# Patient Record
Sex: MALE | Race: BLACK OR AFRICAN AMERICAN | NOT HISPANIC OR LATINO | URBAN - METROPOLITAN AREA
[De-identification: names, ages, dates, MRNs, and addresses within clinical notes are randomized per-mention and may not be internally consistent; named-entity substitution may affect disease eponyms.]

---

## 2025-01-21 ENCOUNTER — OFFICE VISIT (OUTPATIENT)
Dept: OTOLARYNGOLOGY | Facility: CLINIC | Age: 30
End: 2025-01-21
Payer: COMMERCIAL

## 2025-01-21 VITALS — TEMPERATURE: 97.9 F | HEIGHT: 73 IN | BODY MASS INDEX: 29.29 KG/M2 | WEIGHT: 221 LBS

## 2025-01-21 DIAGNOSIS — R60.9 SWELLING: Primary | ICD-10-CM

## 2025-01-21 DIAGNOSIS — R04.0 EPISTAXIS: ICD-10-CM

## 2025-01-21 DIAGNOSIS — R09.81 NASAL CONGESTION: ICD-10-CM

## 2025-01-21 DIAGNOSIS — J33.9 NASAL POLYP: ICD-10-CM

## 2025-01-21 DIAGNOSIS — J30.9 ALLERGIC RHINITIS, UNSPECIFIED SEASONALITY, UNSPECIFIED TRIGGER: ICD-10-CM

## 2025-01-21 DIAGNOSIS — J32.9 SINUSITIS, UNSPECIFIED CHRONICITY, UNSPECIFIED LOCATION: ICD-10-CM

## 2025-01-21 PROCEDURE — 31231 NASAL ENDOSCOPY DX: CPT

## 2025-01-21 PROCEDURE — 30901 CONTROL OF NOSEBLEED: CPT

## 2025-01-21 PROCEDURE — 3008F BODY MASS INDEX DOCD: CPT

## 2025-01-21 PROCEDURE — 99203 OFFICE O/P NEW LOW 30 MIN: CPT

## 2025-01-21 RX ORDER — AZITHROMYCIN 250 MG/1
TABLET, FILM COATED ORAL
Qty: 6 TABLET | Refills: 0 | Status: SHIPPED | OUTPATIENT
Start: 2025-01-21 | End: 2025-01-26

## 2025-01-21 RX ORDER — METHYLPREDNISOLONE 4 MG/1
TABLET ORAL
Qty: 21 TABLET | Refills: 0 | Status: SHIPPED | OUTPATIENT
Start: 2025-01-21

## 2025-01-21 NOTE — PROGRESS NOTES
"Chief Complaint   Patient presents with    New Patient Visit     CHRONIC SINUSITIS X COUPLE MONTHS     HPI:  Antione Russell is a 29 y.o. male Antione complains for of seasonal allergies which have worsened over the past year.  Today his symptoms include on can controlled nasal congestion using Afrin, frequent nosebleeds and sore throat in the a.m.  He has been using Mucinex for his nasal congestion also.  He has had 3 episodes of epistaxis in the past 5 days all on the right.  He does use Afrin for those also.  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  PMH:  Past Medical History:   Diagnosis Date    Personal history of other infectious and parasitic diseases     History of gonorrhea    Syphilis, unspecified 07/22/2013    Syphilis     Past Surgical History:   Procedure Laterality Date    CIRCUMCISION, PRIMARY  12/07/2015    Elective Circumcision         Medications:     Current Outpatient Medications:     azithromycin (Zithromax Z-Nabeel) 250 mg tablet, Take 2 tabs (500 mg) by mouth today, then 1 tab (250 mg) daily for 4 days., Disp: 6 tablet, Rfl: 0    fluticasone propionate 93 mcg/actuation aerosol breath activated, 2 puffs each nostril twice daily, Disp: 1 mL, Rfl: 11    methylPREDNISolone (Medrol Dospak) 4 mg tablets, Take as directed on package., Disp: 21 tablet, Rfl: 0     Allergies:  No Known Allergies     ROS:  Review of systems normal unless stated otherwise in the HPI and/or PMH.    Physical Exam:  Temperature 36.6 °C (97.9 °F), height 1.854 m (6' 1\"), weight 100 kg (221 lb). Body mass index is 29.16 kg/m².     GENERAL APPEARANCE: Well developed and well nourished.  Alert and oriented in no acute distress.  Normal vocal quality.      HEAD/FACE: No erythema or edema or facial tenderness.  Normal facial nerve function bilaterally.    EAR:       EXTERNAL: Normal pinnas and external auditory canals without lesion or obstructing wax.       MIDDLE EAR: Tympanic membranes intact and mobile with normal " landmarks.  Middle ear space appears well aerated.       TUBE STATUS: N/A       MASTOID CAVITY: N/A       HEARING: Gross hearing assessment is within normal limits.      NOSE:       VISUALIZED USING: Anterior rhinoscopy with headlight and nasal speculum and flex nasal endoscopy       DORSUM: Midline, nontraumatic appearance.       MUCOSA: Normal-appearing.       SECRETIONS: Thick white to clear       SEPTUM: Midline, deviated to right and nonobstructing.       INFERIOR TURBINATES: pale and enlarged       MIDDLE TURBINATES/MEATUS: pale and enlarged with polypoid appearance on the right and polyps noted on the left       BLEEDING: Right Kiesselbach's plexus         ORAL CAVITY/PHARYNX:       TEETH: Adequate dentition.       TONGUE: No mass or lesion.  Normal mobility.       FLOOR OF MOUTH: No mass or lesion.       PALATE: Normal hard palate, soft palate, and uvula.       OROPHARYNX: Normal without mass or lesion.       BUCCAL MUCOSA/GBS: Normal without mass or lesion.       LIPS: Normal.    LARYNX/HYPOPHARYNX/NASOPHARYNX: Flexible laryngoscopy was performed after consent. The flexible laryngoscopy was was placed through the nasal cavity revealing normal oropharynx, normal hypopharynx and normal larynx. Normal bilateral vocal cord mobility without any mucosal masses or lesions.    NECK: No palpable masses or abnormal adenopathy.  Trachea is midline.    THYROID: No thyromegaly or palpable nodule.    SALIVARY GLANDS: Normal bilateral parotid and submandibular glands by inspection and palpation.    TMJ's: Normal.    NEURO: Cranial nerve exam grossly normal bilaterally.     Procedure: Epistaxis site identified with anterior rhinoscopy and headlight at right Kiesselbach's plexus.  Obtained verbal consent and applied topical anesthetic. Cauterized vessel without difficulty patient tolerated well.    Assessment/Plan   Antione was seen today for new patient visit.  Diagnoses and all orders for this visit:  Swelling (Primary)  -      methylPREDNISolone (Medrol Dospak) 4 mg tablets; Take as directed on package.  Sinusitis, unspecified chronicity, unspecified location  -     azithromycin (Zithromax Z-Nabeel) 250 mg tablet; Take 2 tabs (500 mg) by mouth today, then 1 tab (250 mg) daily for 4 days.  Nasal polyp  -     fluticasone propionate 93 mcg/actuation aerosol breath activated; 2 puffs each nostril twice daily  Epistaxis  Allergic rhinitis, unspecified seasonality, unspecified trigger  Nasal congestion     Instructions given for epistaxis control.  I have asked him to use triple antibiotic twice daily both nares x 7 days.  I have asked him to limit his lifting bending blowing and snorting.  For 7 days also.  I gave him samples of saline gel to use for nasal dryness.  I have asked him to stop using Afrin except if needed for a nosebleed.  I will give him a Medrol Dosepak, Zithromax and Xhance to decrease his nasal nasal congestion and to aid in polyp reduction.  We reviewed this information twice he was agreeable and will will return in 4 weeks for a recheck.    Follow up in about 4 weeks (around 2/18/2025).     FABIO Oreilly-CNP

## 2025-02-07 ENCOUNTER — TELEPHONE (OUTPATIENT)
Dept: OTOLARYNGOLOGY | Facility: CLINIC | Age: 30
End: 2025-02-07
Payer: COMMERCIAL

## 2025-02-07 NOTE — TELEPHONE ENCOUNTER
"Lov 1/21/2025 FOR SINUSITIS AND WAS RX'D ZPAK AND MDP.  He completed as rx'd, except one day of prednisone he had to catch up the following day - I believe he said it was day 4-5. Since completing he says he is having \"twitching\" of his left kidney and left face. He said it is not painful. Please advise.   "

## 2025-02-18 ENCOUNTER — APPOINTMENT (OUTPATIENT)
Dept: OTOLARYNGOLOGY | Facility: CLINIC | Age: 30
End: 2025-02-18
Payer: COMMERCIAL

## 2025-02-18 VITALS — WEIGHT: 240 LBS | BODY MASS INDEX: 31.81 KG/M2 | HEIGHT: 73 IN

## 2025-02-18 DIAGNOSIS — J33.9 NASAL POLYP: ICD-10-CM

## 2025-02-18 DIAGNOSIS — R09.81 NASAL CONGESTION: Primary | ICD-10-CM

## 2025-02-18 DIAGNOSIS — J30.89 SEASONAL AND PERENNIAL ALLERGIC RHINITIS: ICD-10-CM

## 2025-02-18 DIAGNOSIS — J30.2 SEASONAL AND PERENNIAL ALLERGIC RHINITIS: ICD-10-CM

## 2025-02-18 DIAGNOSIS — J32.9 SINUSITIS, UNSPECIFIED CHRONICITY, UNSPECIFIED LOCATION: ICD-10-CM

## 2025-02-18 DIAGNOSIS — R04.0 EPISTAXIS: ICD-10-CM

## 2025-02-18 PROCEDURE — 99212 OFFICE O/P EST SF 10 MIN: CPT

## 2025-02-18 PROCEDURE — 3008F BODY MASS INDEX DOCD: CPT

## 2025-02-18 NOTE — PROGRESS NOTES
"Chief Complaint   Patient presents with    Follow-up     EP- 1 MO FOLLOW UP, NOSEBLEEDS/SINUS ISSUES     HPI:  Antione Russell is a 29 y.o. male here for follow-up after treatment for nasal congestion with nasal polyposis, and epistaxis.  He reports following antibiotic and steroids he initially felt much better.  No further epistaxis.  But dog destroyed Xhance and he never started using it report reports nasal congestion is only slightly better.     Antione complains for of seasonal allergies which have worsened over the past year.  Today his symptoms include on can controlled nasal congestion using Afrin, frequent nosebleeds and sore throat in the a.m.  He has been using Mucinex for his nasal congestion also.  He has had 3 episodes of epistaxis in the past 5 days all on the right.  He does use Afrin for those also.  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    PMH:  Past Medical History:   Diagnosis Date    Personal history of other infectious and parasitic diseases     History of gonorrhea    Syphilis, unspecified 07/22/2013    Syphilis     Past Surgical History:   Procedure Laterality Date    CIRCUMCISION, PRIMARY  12/07/2015    Elective Circumcision         Medications:     Current Outpatient Medications:     fluticasone propionate 93 mcg/actuation aerosol breath activated, 2 puffs each nostril twice daily, Disp: 1 mL, Rfl: 11    methylPREDNISolone (Medrol Dospak) 4 mg tablets, Take as directed on package. (Patient not taking: Reported on 2/18/2025), Disp: 21 tablet, Rfl: 0     Allergies:  No Known Allergies     ROS:  Review of systems normal unless stated otherwise in the HPI and/or PMH.    Physical Exam:  Height 1.854 m (6' 1\"), weight 109 kg (240 lb). Body mass index is 31.66 kg/m².     GENERAL APPEARANCE: Well developed and well nourished.  Alert and oriented in no acute distress.  Normal vocal quality.      HEAD/FACE: No erythema or edema or facial tenderness.  Normal facial nerve function " bilaterally.    EAR:       EXTERNAL: Normal pinnas and external auditory canals without lesion or obstructing wax.       MIDDLE EAR: Tympanic membranes intact and mobile with normal landmarks.  Middle ear space appears well aerated.       TUBE STATUS: N/A       MASTOID CAVITY: N/A       HEARING: Gross hearing assessment is within normal limits.      NOSE:       VISUALIZED USING: Anterior rhinoscopy with headlight and nasal speculum and flex nasal endoscopy       DORSUM: Midline, nontraumatic appearance.       MUCOSA: Normal-appearing.       SECRETIONS: Clear today       SEPTUM: Midline, deviated to right and nonobstructing.       INFERIOR TURBINATES: pale and enlarged       MIDDLE TURBINATES/MEATUS: Posterior to inferior turbinate polyp noted on the left       BLEEDING: Right Kiesselbach's plexus         ORAL CAVITY/PHARYNX:       TEETH: Adequate dentition.       TONGUE: No mass or lesion.  Normal mobility.       FLOOR OF MOUTH: No mass or lesion.       PALATE: Normal hard palate, soft palate, and uvula.       OROPHARYNX: Normal without mass or lesion.       BUCCAL MUCOSA/GBS: Normal without mass or lesion.       LIPS: Normal.    LARYNX/HYPOPHARYNX/NASOPHARYNX: N/A    NECK: No palpable masses or abnormal adenopathy.  Trachea is midline.    THYROID: No thyromegaly or palpable nodule.    SALIVARY GLANDS: Normal bilateral parotid and submandibular glands by inspection and palpation.    TMJ's: Normal.    NEURO: Cranial nerve exam grossly normal bilaterally.     Procedure: Epistaxis site identified with anterior rhinoscopy and headlight at right Kiesselbach's plexus.  Obtained verbal consent and applied topical anesthetic. Cauterized vessel without difficulty patient tolerated well.    Assessment/Plan   Antione was seen today for follow-up.  Diagnoses and all orders for this visit:  Nasal congestion (Primary)  Seasonal and perennial allergic rhinitis  -     Allergy tests with airborne; Future  Nasal polyp  Sinusitis,  unspecified chronicity, unspecified location  Epistaxis    We discussed the use of Xhance and gave him sample again today with video training and pharmacy information.  We further discussed possible allergies and treatments.  He would like to proceed with allergy testing.  He will reschedule after allergy testing and Xhance trial.  If symptoms have not improved we will order CT of sinuses.  No follow-ups on file.     Lisandra Carlos, APRN-CNP

## 2025-03-06 ENCOUNTER — APPOINTMENT (OUTPATIENT)
Dept: OTOLARYNGOLOGY | Facility: CLINIC | Age: 30
End: 2025-03-06
Payer: COMMERCIAL

## 2025-03-13 ENCOUNTER — APPOINTMENT (OUTPATIENT)
Dept: OTOLARYNGOLOGY | Facility: CLINIC | Age: 30
End: 2025-03-13
Payer: COMMERCIAL

## 2025-05-01 ENCOUNTER — OFFICE VISIT (OUTPATIENT)
Dept: DERMATOLOGY | Facility: CLINIC | Age: 30
End: 2025-05-01
Payer: COMMERCIAL

## 2025-05-01 DIAGNOSIS — D48.5 NEOPLASM OF UNCERTAIN BEHAVIOR OF SKIN: Primary | ICD-10-CM

## 2025-05-01 DIAGNOSIS — A63.0 GENITAL WARTS: ICD-10-CM

## 2025-05-01 PROCEDURE — 54100 BIOPSY OF PENIS: CPT | Performed by: STUDENT IN AN ORGANIZED HEALTH CARE EDUCATION/TRAINING PROGRAM

## 2025-05-01 PROCEDURE — 99204 OFFICE O/P NEW MOD 45 MIN: CPT | Performed by: STUDENT IN AN ORGANIZED HEALTH CARE EDUCATION/TRAINING PROGRAM

## 2025-05-01 NOTE — Clinical Note
Concerning lesion found on exam. DDX for lesion includes: sk vs genital wart vs other. The need for biopsy to aid in diagnosis was discussed and recommended. Risks and benefits of biopsy were reviewed. See procedure note.

## 2025-05-01 NOTE — PROGRESS NOTES
Subjective     Antione Russell is a 29 y.o. male who presents for the following: Suspicious Skin Lesion (Lesion of concern to penis. Noticed lesion about 1 month ago, seen at ED, negative STI's, recommended if did not resolve to see derm.  ).     Review of Systems:  No other skin or systemic complaints other than what is documented elsewhere in the note.    The following portions of the chart were reviewed this encounter and updated as appropriate:          Skin Cancer History  Biopsy Log Book  No skin cancers from Specimen Tracking.    Additional History      Specialty Problems    None       Objective   Well appearing patient in no apparent distress; mood and affect are within normal limits.    A focused skin examination was performed. All findings within normal limits unless otherwise noted below.    Right Penile Shaft  5 mm hyperpigmented smooth papule  Ventral Penile Shaft  Two smooth hyperpigmented papules           Assessment/Plan   NEOPLASM OF UNCERTAIN BEHAVIOR OF SKIN  Right Penile Shaft  Lesion biopsy  Type of biopsy: tangential    Informed consent: discussed and consent obtained    Timeout: patient name, date of birth, surgical site, and procedure verified    Procedure prep:  Patient was prepped and draped  Anesthesia: the lesion was anesthetized in a standard fashion    Anesthetic:  1% lidocaine w/ epinephrine 1-100,000 local infiltration  Instrument used: DermaBlade    Hemostasis achieved with: aluminum chloride    Outcome: patient tolerated procedure well    Post-procedure details: sterile dressing applied and wound care instructions given    Dressing type: petrolatum and bandage      Staff Communication: Dermatology Local Anesthesia: 1 % Lidocaine / Epinephrine - Amount: 3 ml  Specimen 1 - Dermatopathology- DERM LAB  Differential Diagnosis: genital wart vs sk vs other  Check Margins Yes/No?:    Comments:    Dermpath Lab: Routine Histopathology (formalin-fixed tissue)  Concerning lesion found on exam.  DDX for lesion includes: sk vs genital wart vs other. The need for biopsy to aid in diagnosis was discussed and recommended. Risks and benefits of biopsy were reviewed. See procedure note.  GENITAL WARTS  Ventral Penile Shaft  Discussed likely diagnosis and sexually transmitted nature of condition  Reviewed cause by HPV   Can be spread to others   imiquimod (Aldara) 5 % cream - Ventral Penile Shaft  Apply 1 packet topically 3 times a week. Apply to warts nightly MWF and wash off in am. Wash hands thoroughly after each application.     I saw and evaluated the patient. I personally obtained the key and critical portions of the history and physical exam or was physically present for key and critical portions performed by the resident. I reviewed the resident's documentation and discussed the patient with the resident. I agree with the resident's medical decision making as documented in the note.    Roro Payne MD

## 2025-05-01 NOTE — Clinical Note
Discussed likely diagnosis and sexually transmitted nature of condition  Reviewed cause by HPV   Can be spread to others

## 2025-05-02 ENCOUNTER — OFFICE VISIT (OUTPATIENT)
Dept: URGENT CARE | Age: 30
End: 2025-05-02
Payer: COMMERCIAL

## 2025-05-02 VITALS — TEMPERATURE: 98.1 F | HEART RATE: 75 BPM | OXYGEN SATURATION: 97 %

## 2025-05-02 DIAGNOSIS — M72.2 PLANTAR FASCIITIS OF LEFT FOOT: Primary | ICD-10-CM

## 2025-05-02 RX ORDER — IMIQUIMOD 12.5 MG/.25G
1 CREAM TOPICAL 3 TIMES WEEKLY
Qty: 24 PACKET | Refills: 1 | Status: SHIPPED | OUTPATIENT
Start: 2025-05-02 | End: 2026-05-02

## 2025-05-02 RX ORDER — IBUPROFEN 600 MG/1
600 TABLET ORAL EVERY 8 HOURS PRN
Qty: 60 TABLET | Refills: 0 | Status: SHIPPED | OUTPATIENT
Start: 2025-05-02 | End: 2025-05-22

## 2025-05-02 NOTE — PROGRESS NOTES
History Of Present Illness:   Antione Russell             Patient presents to the [facility]:     Plantar aspect of left foot.  He has noticed lumps on the bottom of his left foot for the past year and a half.  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  Yesterday he noticed sharp pain in the plantar aspect of his left midfoot.  Since then he has had worsening pain than usual.  Usually his pain comes and goes.                       Social History:    - work: , stands in a warehouse often.  Will need a work note stating that he can return to work tomorrow.                                 Review of Systems (BOLD if positive, delete if not asked):     Constitutional:   - fever   - chills   - night sweats  - unexpected weight change         Cardiovascular:   - chest pain  - chest heaviness  - palpitations  - swelling in ankles       Respiratory:   - shortness of breath  - difficulty breathing  - frequent cough  - wheezing    Musculoskeletal:   - bone pain  - muscle pain  - joint pain   -low back pain       Neurological:   - headache  - loss of consciousness  - tremors  - dizzy spells  - numbness   - tingling       Gastrointestinal:   - abdominal pain           Skin:   - Rash  - lumps        Hematologic/Lymphatic:   - swollen glands  - blood clotting problems  - easy bruising       Last Recorded Vitals:  Vitals:    05/02/25 1559   Pulse: 75   Temp: 36.7 °C (98.1 °F)   SpO2: 97%        Past Medical History:  He has a past medical history of Personal history of other infectious and parasitic diseases and Syphilis, unspecified (07/22/2013).     Past Surgical History:  He has a past surgical history that includes Circumcision, primary (12/07/2015).     Social History:  He reports that he has been smoking cigarettes. He has never been exposed to tobacco smoke. He has  never used smokeless tobacco. No history on file for alcohol use and drug use.     Family History:  Family History[1]  Allergies:  Patient has no known allergies.     Outpatient Medications:  Current Outpatient Medications   Medication Instructions    fluticasone propionate 93 mcg/actuation aerosol breath activated 2 puffs each nostril twice daily    imiquimod (Aldara) 5 % cream 1 packet, Topical, 3 times weekly, Apply to warts nightly MWF and wash off in am. Wash hands thoroughly after each application.    methylPREDNISolone (Medrol Dospak) 4 mg tablets Take as directed on package.                Physical Exam:  GENERAL: Well developed, well nourished, alert and cooperative, and appears to be in no acute distress.     PSYCH: mood pleasant and appropriate     HEAD: normocephalic            LUNGS: Good respiratory effort.            GAIT: Normal           EXTREMITIES: All 4 extremities are warm and well perfused.   Peripheral pulses intact.            MUSCULOSKELETAL:  Left foot: Pes planus noted on exam, lacking flexibility.  Plantar fascia was minimally tender to palpation.  There were a few discrete nodules midfoot overlying his arch that were tender to palpation.  Patient's assistance there was no new skin changes or discoloration.             TIBIA & FIBIA  MRN: [id number]  Patient Name: NICHOLAS WILLOUGHBY     STUDY:  KNEE; COMPLT, 4 OR MORE VIEWS; TIBIA ;  Left6/4/2022 9:04 pm     INDICATION:  Fall ; tibial tuberosity trauma .     COMPARISON:  None.     ACCESSION NUMBER(S):  [id number]; [id number]     ORDERING CLINICIAN:  ZBIGNIEW CANO     FINDINGS:  Four views of the left knee and AP and lateral views of the left  tibia/fibula were obtained.     Left knee: There is no acute fracture or dislocation.  There is a  small suprapatellar joint effusion. The soft tissues are unremarkable.     Left tibia/fibula: There is no acute fracture or dislocation. The  soft tissues are unremarkable.     IMPRESSION:  1.  No radiographic  evidence for acute fracture at the left knee,  tibia or fibula. Small joint effusion at the left knee..        XR KNEE COMPLETE 4 OR MORE VIEWS  MRN: [id number]  Patient Name: NICHOLAS WILLOUGHBY     STUDY:  KNEE; COMPLT, 4 OR MORE VIEWS; TIBIA ;  Left6/4/2022 9:04 pm     INDICATION:  Fall ; tibial tuberosity trauma .     COMPARISON:  None.     ACCESSION NUMBER(S):  [id number]; [id number]     ORDERING CLINICIAN:  ZBIGNIEW CANO     FINDINGS:  Four views of the left knee and AP and lateral views of the left  tibia/fibula were obtained.     Left knee: There is no acute fracture or dislocation.  There is a  small suprapatellar joint effusion. The soft tissues are unremarkable.     Left tibia/fibula: There is no acute fracture or dislocation. The  soft tissues are unremarkable.     IMPRESSION:  1.  No radiographic evidence for acute fracture at the left knee,  tibia or fibula. Small joint effusion at the left knee..                     Assessment/Plan   Problem List Items Addressed This Visit    None  Visit Diagnoses         Codes      Plantar fasciitis of left foot    -  Primary M72.2                   Nice to meet you in the urgent care today.      Aspects of pes planus and nodules on the plantar aspect of your plantar fascia.  We discussed calf stretching daily and dorsiflexing your left first toe which we show today in the office.    Also recommend over-the-counter arch supports.    You could consider following up with podiatry or primary care or sports medicine for further evaluation of your feet.      New medications today:  - Ibuprofen 600 mg prescription            Referrals and advanced imaging scheduling line: [phone].  Another scheduling number is: [phone].  Another potential phone number is: [phone] ([phone]).  The number for pediatric referrals is: [phone].            Work note printed.           Patient disposition: Home      Recommend you get evaluated in the Emergency Department or call  9-1-1, if you experience chest pain, severe difficulty with breathing, loss of consciousness, major trauma, uncontrolled fever or blood pressure, sustained heart rate over 100, loss of vision, more than a teaspoon of bleeding from your GI tract, or signs of stroke.         Abdirizak Gerardo DO         [1] No family history on file.

## 2025-05-02 NOTE — PATIENT INSTRUCTIONS
Aspects of pes planus and nodules on the plantar aspect of your plantar fascia.  We discussed calf stretching daily and dorsiflexing your left first toe which we show today in the office.    Also recommend over-the-counter arch supports.    You could consider following up with podiatry or primary care or sports medicine for further evaluation of your feet.      New medications today:  - Ibuprofen 600 mg prescription

## 2025-05-02 NOTE — LETTER
May 2, 2025     Patient: Antione Russell   YOB: 1995   Date of Visit: 5/2/2025       To Whom It May Concern:    Antione Russell was seen in my clinic on 5/2/2025 at 4:00 pm. Please excuse Antione for his absence from work on this day to make the appointment.      May fully RTW tomorrow.     If you have any questions or concerns, please don't hesitate to call.         Sincerely,         CCWS [facility]        CC: No Recipients

## 2025-05-05 LAB
LABORATORY COMMENT REPORT: NORMAL
PATH REPORT.FINAL DX SPEC: NORMAL
PATH REPORT.GROSS SPEC: NORMAL
PATH REPORT.MICROSCOPIC SPEC OTHER STN: NORMAL
PATH REPORT.RELEVANT HX SPEC: NORMAL
PATH REPORT.TOTAL CANCER: NORMAL

## 2025-06-27 ENCOUNTER — HOSPITAL ENCOUNTER (EMERGENCY)
Facility: HOSPITAL | Age: 30
Discharge: HOME | End: 2025-06-27
Payer: COMMERCIAL

## 2025-06-27 VITALS
DIASTOLIC BLOOD PRESSURE: 88 MMHG | SYSTOLIC BLOOD PRESSURE: 142 MMHG | BODY MASS INDEX: 31.66 KG/M2 | OXYGEN SATURATION: 98 % | HEART RATE: 85 BPM | TEMPERATURE: 97.9 F | RESPIRATION RATE: 16 BRPM | WEIGHT: 240 LBS

## 2025-06-27 DIAGNOSIS — A60.02 HERPES SIMPLEX OF MALE GENITALIA: ICD-10-CM

## 2025-06-27 DIAGNOSIS — Z20.2 STD EXPOSURE: Primary | ICD-10-CM

## 2025-06-27 LAB
APPEARANCE UR: CLEAR
BILIRUB UR STRIP.AUTO-MCNC: NEGATIVE MG/DL
COLOR UR: YELLOW
GLUCOSE UR STRIP.AUTO-MCNC: NORMAL MG/DL
HCV AB SER QL: NONREACTIVE
HERPES SIMPLEX VIRUS 1 IGG: 0.3 INDEX
HERPES SIMPLEX VIRUS 2 IGG: <0.2 INDEX
HIV 1+2 AB+HIV1 P24 AG SERPL QL IA: NONREACTIVE
KETONES UR STRIP.AUTO-MCNC: NEGATIVE MG/DL
LEUKOCYTE ESTERASE UR QL STRIP.AUTO: NEGATIVE
NITRITE UR QL STRIP.AUTO: NEGATIVE
PH UR STRIP.AUTO: 5.5 [PH]
PROT UR STRIP.AUTO-MCNC: NEGATIVE MG/DL
RBC # UR STRIP.AUTO: NEGATIVE MG/DL
SP GR UR STRIP.AUTO: 1.03
TREPONEMA PALLIDUM IGG+IGM AB [PRESENCE] IN SERUM OR PLASMA BY IMMUNOASSAY: NONREACTIVE
UROBILINOGEN UR STRIP.AUTO-MCNC: NORMAL MG/DL

## 2025-06-27 PROCEDURE — 36415 COLL VENOUS BLD VENIPUNCTURE: CPT

## 2025-06-27 PROCEDURE — 96372 THER/PROPH/DIAG INJ SC/IM: CPT

## 2025-06-27 PROCEDURE — 87389 HIV-1 AG W/HIV-1&-2 AB AG IA: CPT

## 2025-06-27 PROCEDURE — 81003 URINALYSIS AUTO W/O SCOPE: CPT

## 2025-06-27 PROCEDURE — 86696 HERPES SIMPLEX TYPE 2 TEST: CPT

## 2025-06-27 PROCEDURE — 99284 EMERGENCY DEPT VISIT MOD MDM: CPT

## 2025-06-27 PROCEDURE — 2500000001 HC RX 250 WO HCPCS SELF ADMINISTERED DRUGS (ALT 637 FOR MEDICARE OP)

## 2025-06-27 PROCEDURE — 87491 CHLMYD TRACH DNA AMP PROBE: CPT

## 2025-06-27 PROCEDURE — 87529 HSV DNA AMP PROBE: CPT

## 2025-06-27 PROCEDURE — 87661 TRICHOMONAS VAGINALIS AMPLIF: CPT

## 2025-06-27 PROCEDURE — 2500000004 HC RX 250 GENERAL PHARMACY W/ HCPCS (ALT 636 FOR OP/ED)

## 2025-06-27 PROCEDURE — 86780 TREPONEMA PALLIDUM: CPT

## 2025-06-27 PROCEDURE — 86803 HEPATITIS C AB TEST: CPT

## 2025-06-27 RX ORDER — ACYCLOVIR 400 MG/1
800 TABLET ORAL ONCE
Status: DISCONTINUED | OUTPATIENT
Start: 2025-06-27 | End: 2025-06-27 | Stop reason: HOSPADM

## 2025-06-27 RX ORDER — CEFTRIAXONE 500 MG/1
500 INJECTION, POWDER, FOR SOLUTION INTRAMUSCULAR; INTRAVENOUS ONCE
Status: COMPLETED | OUTPATIENT
Start: 2025-06-27 | End: 2025-06-27

## 2025-06-27 RX ORDER — DOXYCYCLINE HYCLATE 100 MG
100 TABLET ORAL ONCE
Status: COMPLETED | OUTPATIENT
Start: 2025-06-27 | End: 2025-06-27

## 2025-06-27 RX ORDER — DOXYCYCLINE 100 MG/1
100 CAPSULE ORAL 2 TIMES DAILY
Qty: 14 CAPSULE | Refills: 0 | Status: SHIPPED | OUTPATIENT
Start: 2025-06-27 | End: 2025-07-04

## 2025-06-27 RX ORDER — ACYCLOVIR 400 MG/1
800 TABLET ORAL 3 TIMES DAILY
Qty: 30 TABLET | Refills: 0 | Status: SHIPPED | OUTPATIENT
Start: 2025-06-27 | End: 2025-07-02

## 2025-06-27 RX ORDER — METRONIDAZOLE 500 MG/1
2000 TABLET ORAL ONCE
Status: COMPLETED | OUTPATIENT
Start: 2025-06-27 | End: 2025-06-27

## 2025-06-27 RX ADMIN — DOXYCYCLINE HYCLATE 100 MG: 100 TABLET, COATED ORAL at 15:54

## 2025-06-27 RX ADMIN — METRONIDAZOLE 2000 MG: 500 TABLET ORAL at 15:54

## 2025-06-27 RX ADMIN — CEFTRIAXONE SODIUM 500 MG: 500 INJECTION, POWDER, FOR SOLUTION INTRAMUSCULAR; INTRAVENOUS at 15:54

## 2025-06-27 ASSESSMENT — PAIN SCALES - GENERAL: PAINLEVEL_OUTOF10: 0 - NO PAIN

## 2025-06-27 ASSESSMENT — PAIN - FUNCTIONAL ASSESSMENT: PAIN_FUNCTIONAL_ASSESSMENT: 0-10

## 2025-06-27 NOTE — DISCHARGE INSTRUCTIONS
Please follow-up with your primary care provider.  Prescription for doxycycline for empiric treatment of chlamydia has been sent to your pharmacy as well as acyclovir for treatment of herpes.  Please take medication as prescribed.  Return to the emergency department if you symptoms persist or worsen or any new symptoms began.  You will receive a phone call with the results of STD testing and you can also see your results in [facility].

## 2025-06-27 NOTE — ED PROVIDER NOTES
HPI   Chief Complaint   Patient presents with    Exposure to STD       HPI    Antione Russell is a 29-year-old male with history of HIV presenting the ED with concern for herpes.  Patient states he had unprotected intercourse with a female on June 5 and is concerned he was exposed to herpes at that time.  Patient states 1 week ago he noticed a lesion to the tip of the penis which increased in size today.  Patient states the lesion is not painful.  Patient states he has had HPV lesion on the penis previously and the lesion currently does not look like the previous HPV lesion.  Patient denies pain to the penis and scrotum.  Patient denies discharge from the penis.  Patient denies dysuria, hematuria, increased urinary frequency, chest pain, shortness of breath, abdominal pain, nausea, vomiting, fevers, body aches, chills.    Patient History   Medical History[1]  Surgical History[2]  Family History[3]  Social History[4]    Physical Exam   ED Triage Vitals [06/27/25 1402]   Temperature Heart Rate Respirations BP   36.6 °C (97.9 °F) 85 16 142/88      Pulse Ox Temp src Heart Rate Source Patient Position   98 % -- -- Sitting      BP Location FiO2 (%)     Left arm --       Physical Exam  Vitals and nursing note reviewed. Exam conducted with a chaperone present.   Constitutional:       Appearance: Normal appearance.   Cardiovascular:      Rate and Rhythm: Normal rate and regular rhythm.      Heart sounds: Normal heart sounds. No murmur heard.     No gallop.   Pulmonary:      Effort: Pulmonary effort is normal. No respiratory distress.      Breath sounds: Normal breath sounds. No stridor. No wheezing, rhonchi or rales.   Abdominal:      General: Abdomen is flat. Bowel sounds are normal. There is no distension.      Palpations: Abdomen is soft. There is no mass.      Tenderness: There is no abdominal tenderness. There is no guarding or rebound.      Hernia: No hernia is present.   Genitourinary:     Pubic Area: No rash.        Penis: Lesions present. No erythema, tenderness, discharge or swelling.       Testes: Normal. Cremasteric reflex is present.         Right: Mass, tenderness or swelling not present.         Left: Mass, tenderness or swelling not present.      Epididymis:      Right: Not inflamed or enlarged. No mass or tenderness.      Left: Not inflamed or enlarged. No mass or tenderness.      Comments: 0.2 cm vesicle lesion to the dorsal penis.  Vesicle is tender.  Three 2 mm lesions to the dorsal penis that appears to be developing into vesicles  Musculoskeletal:      Right lower leg: No edema.      Left lower leg: No edema.   Skin:     General: Skin is warm and dry.   Neurological:      General: No focal deficit present.      Mental Status: He is alert and oriented to person, place, and time.   Psychiatric:         Mood and Affect: Mood normal.         Behavior: Behavior normal.           ED Course & MDM   Diagnoses as of 06/27/25 1921   STD exposure   Herpes simplex of male genitalia                 No data recorded     [place] Coma Scale Score: 15 (06/27/25 1404 : Chivo Del Valle RN)                           Medical Decision Making    This is a 29-year-old male presenting the ED with concern for herpes.  On exam patient does have a vesicle to the dorsal penis concerning for herpes.  A swab of the fluid from the vesicle was collected today to test for HSV.  Patient will be given prescription for acyclovir for treatment of HSV today.  Patient was given the initial dose of acyclovir in the ED today.  Urine test for chlamydia, gonorrhea, trichomonas were obtained today.  Labs for HIV, syphilis, hepatitis C were also obtained today.  Patient states he does want the antibody test for HSV.  Discussed with patient the antibody blood test will not determine if he has an active lesion and will only determine if he has been exposed in the past.  Patient states he still wants the blood test.  UA obtained to rule out UTI and UA is  unremarkable and negative for UTI.  Patient states he does want to be empirically treated for trichomonas, gonorrhea, chlamydia today.  Patient was given IM ceftriaxone for empirical treatment of gonorrhea, Flagyl for empiric treatment of trichomonas, and initial dose of doxycycline to begin.  Treatment of chlamydia.  Patient has been hemodynamically stable in the emergency department today.    Disposition: Discharge home  Patient advised to follow-up with his primary care provider.  Prescription for acyclovir and doxycycline has been sent to the patient's pharmacy.  Patient advised take medication as prescribed.  Strict return precautions were given.  Plan was discussed with the patient patient understands agrees.  Patient was discharged in stable condition.  Procedure  Procedures         [1]   Past Medical History:  Diagnosis Date    Personal history of other infectious and parasitic diseases     History of gonorrhea    Syphilis, unspecified 07/22/2013    Syphilis   [2]   Past Surgical History:  Procedure Laterality Date    CIRCUMCISION, PRIMARY  12/07/2015    Elective Circumcision   [3] No family history on file.  [4]   Social History  Tobacco Use    Smoking status: Some Days     Current packs/day: 0.25     Types: Cigarettes     Passive exposure: Never    Smokeless tobacco: Never   Substance Use Topics    Alcohol use: Not on file    Drug use: Not on file        Enzo Martin PA-C  06/27/25 0945

## 2025-06-27 NOTE — ED TRIAGE NOTES
Patient presented to the ed for evaluation of sti.  Per the patient he is not having any concerns, but has been having unprotected intercourse.  He has no urinary complaints or concerns.

## 2025-06-28 ENCOUNTER — DOCUMENTATION (OUTPATIENT)
Dept: EMERGENCY MEDICINE | Facility: HOSPITAL | Age: 30
End: 2025-06-28
Payer: COMMERCIAL

## 2025-06-28 LAB
C TRACH RRNA SPEC QL NAA+PROBE: NEGATIVE
HSV1 DNA SKIN QL NAA+PROBE: NOT DETECTED
HSV2 DNA SKIN QL NAA+PROBE: NOT DETECTED
N GONORRHOEA DNA SPEC QL PROBE+SIG AMP: NEGATIVE
T VAGINALIS RRNA SPEC QL NAA+PROBE: NEGATIVE

## 2025-06-28 NOTE — PROGRESS NOTES
6/28/2025     Test:   Discussed HIV and HCV testing with the patient in the ED.   Patient received HIV and HCV test today    Test Result:  HIV Negative  HCV Negative  SYPHILIS Negative    Result notification:  Patient was notified of their test results on 06/28/25 via Telephone (after verifying 3 identifiers)    Follow-up plan:   Patient was referred to Other on [Date]  Patient has appointment at [clinic name] on [Date]  Barriers to attending appointment: [free text, none]  Missed appointments: [unfilled]     Signed  PELON Gregory   HIV/HCV [facility]  Certified Community Health Worker - Research  Please contact me via email or MyChart with questions

## 2025-06-30 LAB — HOLD SPECIMEN: NORMAL

## 2025-07-25 ENCOUNTER — APPOINTMENT (OUTPATIENT)
Dept: PRIMARY CARE | Facility: CLINIC | Age: 30
End: 2025-07-25
Payer: COMMERCIAL

## 2025-08-11 ENCOUNTER — APPOINTMENT (OUTPATIENT)
Dept: PRIMARY CARE | Facility: CLINIC | Age: 30
End: 2025-08-11
Payer: COMMERCIAL

## 2025-09-12 ENCOUNTER — APPOINTMENT (OUTPATIENT)
Dept: PRIMARY CARE | Facility: CLINIC | Age: 30
End: 2025-09-12
Payer: COMMERCIAL